# Patient Record
(demographics unavailable — no encounter records)

---

## 2024-12-02 NOTE — ASSESSMENT
[FreeTextEntry1] : Plan Went over environmental allergy skin testing with patient and his mother.  Due to his year round allergies I educated patient on possible treatment.  Discussed allergy injections with patient and his mother via shared decision making. Discussed allergy injection process, protocol and commitment.  Another plan would be medication to treat the symptoms.  For now the patient would like to try medications.  Ryaltris 1 spray in both nostils twice a day Xyzal (levocetirizine) 5 mg QHS  Follow up in one month. Will rediscuss IT. All questions answered.

## 2024-12-02 NOTE — REASON FOR VISIT
[Evaluation/Consultation] : an evaluation/consultation of [Hay Fever] : hay fever [Congestion] : congestion [Mother] : mother

## 2024-12-02 NOTE — IMPRESSION
[_____] : weeds ([unfilled]) [Allergy Testing Dog] : dog [Allergy Testing Cat] : cat [] : molds [FreeTextEntry3] : Eastern Tree Pollen 4+ KORT Grass Mix 4+ Dust Mite DF 3+ Dust Mite DP 3+ Cockebur 3+ National Weed Mix 3+ Plantain 2+ Lambs 2+ Mugwort 4+ [FreeTextEntry2] : Environmental allergy skin test of 4+ to tree, grass, mugwort, 3+ to DF, DP, weed mix, cocklebur, 2+ to planting, lambs quarters, negative to all others.

## 2024-12-02 NOTE — HISTORY OF PRESENT ILLNESS
[de-identified] : Ramos comes in today with his mother in regards to his seasonal allergies. Ramos feels Springtime is when he develops the worst of his allergies and usually takes Claritin & Fluticasone.  His sense of smell is fine, no history of any sinus infections. Ramos states he feels his nasal congestion is always present.   The family has two willis retrievers at home, when he plays with the dogs, his nose becomes stuffy and his eyes itch.    Otherwise Ramos is a healthy 16 year old, he does not take any daily medications.

## 2024-12-02 NOTE — HISTORY OF PRESENT ILLNESS
[de-identified] : Ramos comes in today with his mother in regards to his seasonal allergies. Ramos feels Springtime is when he develops the worst of his allergies and usually takes Claritin & Fluticasone.  His sense of smell is fine, no history of any sinus infections. Ramos states he feels his nasal congestion is always present.   The family has two willis retrievers at home, when he plays with the dogs, his nose becomes stuffy and his eyes itch.    Otherwise Ramos is a healthy 16 year old, he does not take any daily medications.